# Patient Record
Sex: FEMALE | Race: BLACK OR AFRICAN AMERICAN | NOT HISPANIC OR LATINO | Employment: UNEMPLOYED | URBAN - METROPOLITAN AREA
[De-identification: names, ages, dates, MRNs, and addresses within clinical notes are randomized per-mention and may not be internally consistent; named-entity substitution may affect disease eponyms.]

---

## 2018-09-07 ENCOUNTER — HOSPITAL ENCOUNTER (OUTPATIENT)
Dept: RADIOLOGY | Facility: HOSPITAL | Age: 68
Discharge: HOME | End: 2018-09-07
Attending: INTERNAL MEDICINE
Payer: MEDICARE

## 2018-09-07 DIAGNOSIS — M15.0 PRIMARY GENERALIZED (OSTEO)ARTHRITIS: Primary | ICD-10-CM

## 2018-09-07 DIAGNOSIS — M15.0 PRIMARY GENERALIZED (OSTEO)ARTHRITIS: ICD-10-CM

## 2018-09-07 PROCEDURE — 73560 X-RAY EXAM OF KNEE 1 OR 2: CPT | Mod: TC,FY,PO,RT

## 2018-10-29 DIAGNOSIS — Z12.39 ENCOUNTER FOR SPECIAL SCREENING EXAMINATION FOR NEOPLASM OF BREAST: Primary | ICD-10-CM

## 2018-10-30 ENCOUNTER — HOSPITAL ENCOUNTER (OUTPATIENT)
Dept: RADIOLOGY | Facility: HOSPITAL | Age: 68
Discharge: HOME | End: 2018-10-30
Attending: INTERNAL MEDICINE
Payer: MEDICAID

## 2018-10-30 DIAGNOSIS — Z12.39 ENCOUNTER FOR SPECIAL SCREENING EXAMINATION FOR NEOPLASM OF BREAST: ICD-10-CM

## 2018-10-30 PROCEDURE — 77063 BREAST TOMOSYNTHESIS BI: CPT | Mod: TC,PO

## 2018-12-18 ENCOUNTER — HOSPITAL ENCOUNTER (OUTPATIENT)
Dept: RADIOLOGY | Facility: HOSPITAL | Age: 68
Discharge: HOME | End: 2018-12-18
Attending: INTERNAL MEDICINE
Payer: MEDICAID

## 2018-12-18 DIAGNOSIS — M54.16 RADICULOPATHY, LUMBAR REGION: Primary | ICD-10-CM

## 2018-12-18 DIAGNOSIS — M54.16 RADICULOPATHY, LUMBAR REGION: ICD-10-CM

## 2018-12-18 PROCEDURE — 72100 X-RAY EXAM L-S SPINE 2/3 VWS: CPT | Mod: TC,FY,PO

## 2019-04-17 ENCOUNTER — HOSPITAL ENCOUNTER (EMERGENCY)
Facility: HOSPITAL | Age: 69
Discharge: HOME | End: 2019-04-17
Attending: SURGERY
Payer: MEDICAID

## 2019-04-17 VITALS
OXYGEN SATURATION: 99 % | RESPIRATION RATE: 21 BRPM | WEIGHT: 150 LBS | SYSTOLIC BLOOD PRESSURE: 153 MMHG | TEMPERATURE: 98 F | DIASTOLIC BLOOD PRESSURE: 72 MMHG | HEART RATE: 46 BPM | BODY MASS INDEX: 27.6 KG/M2 | HEIGHT: 62 IN

## 2019-04-17 DIAGNOSIS — R07.9 CHEST PAIN: ICD-10-CM

## 2019-04-17 DIAGNOSIS — H54.7 AMAUROSIS: ICD-10-CM

## 2019-04-17 DIAGNOSIS — I20.89 STABLE ANGINA: Primary | ICD-10-CM

## 2019-04-17 LAB
ALBUMIN SERPL BCP-MCNC: 3.9 G/DL (ref 3.5–5.2)
ALP SERPL-CCNC: 74 U/L (ref 38–126)
ALT SERPL W/O P-5'-P-CCNC: 27 U/L (ref 10–44)
ANION GAP SERPL CALC-SCNC: 4 MMOL/L (ref 8–16)
ANISOCYTOSIS BLD QL SMEAR: SLIGHT
AST SERPL-CCNC: 23 U/L (ref 15–46)
BASOPHILS # BLD AUTO: 0.02 K/UL (ref 0–0.2)
BASOPHILS NFR BLD: 0.4 % (ref 0–1.9)
BILIRUB SERPL-MCNC: 0.9 MG/DL (ref 0.1–1)
BILIRUB UR QL STRIP: NEGATIVE
BUN SERPL-MCNC: 19 MG/DL (ref 7–17)
CALCIUM SERPL-MCNC: 9 MG/DL (ref 8.7–10.5)
CHLORIDE SERPL-SCNC: 113 MMOL/L (ref 95–110)
CLARITY UR REFRACT.AUTO: CLEAR
CO2 SERPL-SCNC: 25 MMOL/L (ref 23–29)
COLOR UR AUTO: YELLOW
CREAT SERPL-MCNC: 0.93 MG/DL (ref 0.5–1.4)
DIFFERENTIAL METHOD: ABNORMAL
EOSINOPHIL # BLD AUTO: 0.1 K/UL (ref 0–0.5)
EOSINOPHIL NFR BLD: 2.6 % (ref 0–8)
ERYTHROCYTE [DISTWIDTH] IN BLOOD BY AUTOMATED COUNT: 16.3 % (ref 11.5–14.5)
EST. GFR  (AFRICAN AMERICAN): >60 ML/MIN/1.73 M^2
EST. GFR  (NON AFRICAN AMERICAN): >60 ML/MIN/1.73 M^2
GLUCOSE SERPL-MCNC: 91 MG/DL (ref 70–110)
GLUCOSE UR QL STRIP: NEGATIVE
HCT VFR BLD AUTO: 28.4 % (ref 37–48.5)
HGB BLD-MCNC: 8.5 G/DL (ref 12–16)
HGB UR QL STRIP: NEGATIVE
HYPOCHROMIA BLD QL SMEAR: ABNORMAL
INR PPP: 1.2 (ref 0.8–1.2)
KETONES UR QL STRIP: NEGATIVE
LEUKOCYTE ESTERASE UR QL STRIP: ABNORMAL
LYMPHOCYTES # BLD AUTO: 1.5 K/UL (ref 1–4.8)
LYMPHOCYTES NFR BLD: 30.6 % (ref 18–48)
MCH RBC QN AUTO: 19 PG (ref 27–31)
MCHC RBC AUTO-ENTMCNC: 29.9 G/DL (ref 32–36)
MCV RBC AUTO: 64 FL (ref 82–98)
MICROSCOPIC COMMENT: NORMAL
MONOCYTES # BLD AUTO: 0.5 K/UL (ref 0.3–1)
MONOCYTES NFR BLD: 9.5 % (ref 4–15)
NEUTROPHILS # BLD AUTO: 2.8 K/UL (ref 1.8–7.7)
NEUTROPHILS NFR BLD: 56.9 % (ref 38–73)
NITRITE UR QL STRIP: NEGATIVE
NT-PROBNP: 758 PG/ML (ref 5–900)
PH UR STRIP: 5 [PH] (ref 5–8)
PLATELET # BLD AUTO: 206 K/UL (ref 150–350)
PMV BLD AUTO: 10.5 FL (ref 9.2–12.9)
POTASSIUM SERPL-SCNC: 4.1 MMOL/L (ref 3.5–5.1)
PROT SERPL-MCNC: 6.9 G/DL (ref 6–8.4)
PROT UR QL STRIP: NEGATIVE
PROTHROMBIN TIME: 12.8 SEC (ref 9–12.5)
RBC # BLD AUTO: 4.47 M/UL (ref 4–5.4)
SODIUM SERPL-SCNC: 142 MMOL/L (ref 136–145)
SP GR UR STRIP: 1.02 (ref 1–1.03)
TROPONIN I SERPL DL<=0.01 NG/ML-MCNC: <0.012 NG/ML (ref 0.01–0.03)
URN SPEC COLLECT METH UR: ABNORMAL
UROBILINOGEN UR STRIP-ACNC: NEGATIVE EU/DL
WBC # BLD AUTO: 4.97 K/UL (ref 3.9–12.7)
WBC #/AREA URNS AUTO: 4 /HPF (ref 0–5)

## 2019-04-17 PROCEDURE — 25000003 PHARM REV CODE 250: Mod: ER | Performed by: SURGERY

## 2019-04-17 PROCEDURE — 93010 ELECTROCARDIOGRAM REPORT: CPT | Mod: ,,, | Performed by: INTERNAL MEDICINE

## 2019-04-17 PROCEDURE — 85610 PROTHROMBIN TIME: CPT | Mod: ER

## 2019-04-17 PROCEDURE — 83880 ASSAY OF NATRIURETIC PEPTIDE: CPT | Mod: ER

## 2019-04-17 PROCEDURE — 84484 ASSAY OF TROPONIN QUANT: CPT | Mod: ER

## 2019-04-17 PROCEDURE — 81000 URINALYSIS NONAUTO W/SCOPE: CPT | Mod: ER

## 2019-04-17 PROCEDURE — 99285 EMERGENCY DEPT VISIT HI MDM: CPT | Mod: ER

## 2019-04-17 PROCEDURE — 93005 ELECTROCARDIOGRAM TRACING: CPT | Mod: ER

## 2019-04-17 PROCEDURE — 80053 COMPREHEN METABOLIC PANEL: CPT | Mod: ER

## 2019-04-17 PROCEDURE — 93010 EKG 12-LEAD: ICD-10-PCS | Mod: ,,, | Performed by: INTERNAL MEDICINE

## 2019-04-17 PROCEDURE — 85025 COMPLETE CBC W/AUTO DIFF WBC: CPT | Mod: ER

## 2019-04-17 RX ORDER — FOLIC ACID 1 MG/1
1 TABLET ORAL DAILY
COMMUNITY

## 2019-04-17 RX ORDER — TAMSULOSIN HYDROCHLORIDE 0.4 MG/1
0.4 CAPSULE ORAL DAILY
COMMUNITY

## 2019-04-17 RX ORDER — HYDROCODONE BITARTRATE AND ACETAMINOPHEN 5; 325 MG/1; MG/1
1 TABLET ORAL EVERY 12 HOURS PRN
Qty: 10 TABLET | Refills: 0 | Status: SHIPPED | OUTPATIENT
Start: 2019-04-17

## 2019-04-17 RX ORDER — FOLIC ACID 0.8 MG
800 TABLET ORAL DAILY
COMMUNITY

## 2019-04-17 RX ORDER — LEVOTHYROXINE SODIUM 100 UG/1
100 TABLET ORAL DAILY
COMMUNITY

## 2019-04-17 RX ORDER — NITROGLYCERIN 0.4 MG/1
0.4 TABLET SUBLINGUAL EVERY 5 MIN PRN
Qty: 100 TABLET | Refills: 0 | Status: SHIPPED | OUTPATIENT
Start: 2019-04-17 | End: 2020-04-16

## 2019-04-17 RX ORDER — NAPROXEN SODIUM 220 MG/1
81 TABLET, FILM COATED ORAL DAILY
COMMUNITY

## 2019-04-17 RX ORDER — HYDROCODONE BITARTRATE AND ACETAMINOPHEN 5; 325 MG/1; MG/1
1 TABLET ORAL
Status: COMPLETED | OUTPATIENT
Start: 2019-04-17 | End: 2019-04-17

## 2019-04-17 RX ORDER — ATORVASTATIN CALCIUM 20 MG/1
20 TABLET, FILM COATED ORAL DAILY
COMMUNITY

## 2019-04-17 RX ORDER — LISINOPRIL 10 MG/1
10 TABLET ORAL DAILY
COMMUNITY

## 2019-04-17 RX ORDER — FERROUS SULFATE 325(65) MG
325 TABLET ORAL
COMMUNITY

## 2019-04-17 RX ORDER — UBIDECARENONE 75 MG
500 CAPSULE ORAL DAILY
COMMUNITY

## 2019-04-17 RX ORDER — MELOXICAM 7.5 MG/1
7.5 TABLET ORAL DAILY
COMMUNITY

## 2019-04-17 RX ORDER — GLIPIZIDE 2.5 MG/1
5 TABLET, EXTENDED RELEASE ORAL
COMMUNITY

## 2019-04-17 RX ADMIN — LIDOCAINE HYDROCHLORIDE: 20 SOLUTION ORAL; TOPICAL at 01:04

## 2019-04-17 RX ADMIN — HYDROCODONE BITARTRATE AND ACETAMINOPHEN 1 TABLET: 5; 325 TABLET ORAL at 01:04

## 2019-04-17 NOTE — ED PROVIDER NOTES
"Encounter Date: 4/17/2019       History     Chief Complaint   Patient presents with    Chest Pain     Pt states has had chest pain off and on since last pm, pt went to PCP this am and was sent to ED for evaluation.  Pt denies SOB.  Pt states has "sharp" pain to midsternal area.       Patient seen with complaint of with intermittent left-sided chest pain with left hand tingling since yesterday.  The pain is well localized to chest wall above her breast and is reproducible She has had a history of a cardiac stent placed in [place] 7 years ago for a failed stress test.  She has not seen any cardiologist here as she had moved into the area recently.  She has a history of thalassemia.  She has a history of transient monoocular vision loss 2 weeks ago seen by ophthalmologist.  She has no history of carotid vascular disease  She has a history of hypertension and diabetes.  She is on multiple medications including aspirin, statins, iron supplements thyroxine lisinopril Januvia and Flomax    The history is provided by the patient.   Chest Pain   The current episode started yesterday. Duration of episode(s) is 1 day. Chest pain occurs intermittently. The chest pain is improving. The pain is associated with exertion. At its most intense, the chest pain is at 5/10. The chest pain is currently at 4/10. The quality of the pain is described as aching. The pain does not radiate. Chest pain is worsened by certain positions and exertion. Pertinent negatives for primary symptoms include no syncope, no shortness of breath, no cough, no wheezing, no nausea, no vomiting, no dizziness and no altered mental status. She tried nothing for the symptoms.     Review of patient's allergies indicates:  No Known Allergies  Past Medical History:   Diagnosis Date    Asthma     Diabetes mellitus     Heart attack     per pt    Hypertension     Thalassemia     Thyroid disease      Past Surgical History:   Procedure Laterality Date    " HYSTERECTOMY      OOPHORECTOMY      THYROID SURGERY       History reviewed. No pertinent family history.  Social History     Tobacco Use    Smoking status: Never Smoker    Smokeless tobacco: Never Used   Substance Use Topics    Alcohol use: Not Currently    Drug use: Never     Review of Systems   Constitutional: Negative.    HENT: Negative.    Eyes: Negative.    Respiratory: Negative.  Negative for cough, shortness of breath and wheezing.    Cardiovascular: Positive for chest pain. Negative for syncope.   Gastrointestinal: Negative for nausea and vomiting.   Endocrine: Negative.    Genitourinary: Negative.    Musculoskeletal: Negative.    Skin: Negative.    Allergic/Immunologic: Negative.    Neurological: Negative.  Negative for dizziness.   Hematological: Negative.    Psychiatric/Behavioral: Negative.        Physical Exam     Initial Vitals [04/17/19 1205]   BP Pulse Resp Temp SpO2   127/61 (!) 52 20 98.4 °F (36.9 °C) 98 %      MAP       --         Physical Exam    Nursing note and vitals reviewed.  Constitutional: She appears well-developed and well-nourished.   HENT:   Head: Normocephalic.   Eyes: Conjunctivae are normal.   Neck: Normal range of motion.   Cardiovascular: Normal rate.   Pulmonary/Chest: Breath sounds normal. She exhibits tenderness.   Abdominal: Soft.   Musculoskeletal: She exhibits tenderness.        Arms:  Neurological: She is alert and oriented to person, place, and time. She has normal strength. GCS score is 15. GCS eye subscore is 4. GCS verbal subscore is 5. GCS motor subscore is 6.   Skin: Skin is warm and dry.   Psychiatric: She has a normal mood and affect.         ED Course   Procedures  Labs Reviewed   COMPREHENSIVE METABOLIC PANEL - Abnormal; Notable for the following components:       Result Value    Chloride 113 (*)     BUN, Bld 19 (*)     Anion Gap 4 (*)     All other components within normal limits   CBC W/ AUTO DIFFERENTIAL - Abnormal; Notable for the following components:     Hemoglobin 8.5 (*)     Hematocrit 28.4 (*)     MCV 64 (*)     MCH 19.0 (*)     MCHC 29.9 (*)     RDW 16.3 (*)     All other components within normal limits   URINALYSIS, REFLEX TO URINE CULTURE - Abnormal; Notable for the following components:    Leukocytes, UA 1+ (*)     All other components within normal limits    Narrative:     Preferred Collection Type->Urine, Clean Catch   PROTIME-INR - Abnormal; Notable for the following components:    Prothrombin Time 12.8 (*)     All other components within normal limits   TROPONIN I   NT-PRO NATRIURETIC PEPTIDE   URINALYSIS MICROSCOPIC    Narrative:     Preferred Collection Type->Urine, Clean Catch     EKG Readings: (Independently Interpreted)   Rhythm: Sinus Bradycardia. Heart Rate: 46.     ECG Results          EKG 12-lead (In process)  Result time 04/17/19 12:25:29    In process by Interface, Lab In [facility] (04/17/19 12:25:29)                 Narrative:    Test Reason : R07.9,    Vent. Rate : 046 BPM     Atrial Rate : 046 BPM     P-R Int : 156 ms          QRS Dur : 066 ms      QT Int : 462 ms       P-R-T Axes : 065 034 049 degrees     QTc Int : 404 ms    Sinus bradycardia  Cannot rule out Anterior infarct ,age undetermined  Abnormal ECG  No previous ECGs available    Referred By: [email or web address]   SELF           Confirmed By:                   In process by Interface, Lab In [facility] (04/17/19 12:24:58)                 Narrative:    Test Reason : R07.9,    Vent. Rate : 046 BPM     Atrial Rate : 046 BPM     P-R Int : 156 ms          QRS Dur : 066 ms      QT Int : 462 ms       P-R-T Axes : 065 034 049 degrees     QTc Int : 404 ms    Sinus bradycardia  Cannot rule out Anterior infarct ,age undetermined  Abnormal ECG  No previous ECGs available    Referred By: [email or web address]   SELF           Confirmed By:                             Imaging Results          US Carotid Bilateral (Final result)  Result time 04/17/19 13:05:47    Final result by Salomón Maher MD (Timothy) (04/17/19  13:05:47)                 Impression:      1. Normal velocities and ratios in the extracranial right carotid system. Focal calcified plaque at the bulb.  Peak systolic velocity in the right ICA is 65 cm/sec. No significant stenosis.    2. Normal velocities and ratios in the extracranial left carotid system. Mild plaque is present. Peak systolic velocity in the left ICA is 59 cm/sec. No significant stenosis.    3. Normal antegrade flow in vertebral arteries bilaterally.  High resistive flow in the left vertebral artery suggests the possibility of distal stenosis.    Measurements are based on NASCET criteria.    Stenosis of less than 30%%-validated velocity measurements with angiographic measurements, velocity criteria are extrapolated from diameter data as defined by the Society of Radiologists in Ultrasound Consensus Conference Radiology 2003; 229;340-346.      Electronically signed by: Salomón Maher MD  Date:    04/17/2019  Time:    13:05             Narrative:    EXAMINATION:  US CAROTID BILATERAL    CLINICAL HISTORY:  Unspecified visual loss    FINDINGS:  Color flow and Spectral Doppler Vascular ultrasound was performed of bilateral carotid and vertebral arteries.                               X-Ray Chest AP Portable (Final result)  Result time 04/17/19 12:37:39    Final result by Salomón Maher MD (Timothy) (04/17/19 12:37:39)                 Impression:      Mild cardiomegaly.  Clear lungs.      Electronically signed by: Salomón Maher MD  Date:    04/17/2019  Time:    12:37             Narrative:    EXAMINATION:  XR CHEST AP PORTABLE    CLINICAL HISTORY:  , Chest pain;    COMPARISON:  No prior exams.    FINDINGS:  Atherosclerosis of thoracic aorta.  Mild cardiomegaly.  Clear lungs.                                 Medical Decision Making:   Initial Assessment:   Chest pain of uncertain etiology  ED Management:  Patient had a 24 hr history of chest pain intermittent which is reproducible on physical exam but  she has a history of a cardiac stent.  EKG shows bradycardia but no acute ischemic changes troponin is negative. Patient is chest pain-free but she has a high risk for angina and recommend cardiology evaluation ASAP.  She will make an appointment with Dr. Wilson to see him this week and to return to the ED for any chest pain.  She had a history of loss of vision in 1 eye and the ophthalmologist recommended a carotid ultrasound.  Carotid ultrasound is negative                      Clinical Impression:       ICD-10-CM ICD-9-CM   1. Stable angina I20.8 413.9   2. Chest pain R07.9 786.50   3. Amaurosis H54.7 369.00         Disposition:   Disposition: Discharged  Condition: Stable                        RICKEY Hanks III, MD  04/17/19 3575

## 2019-09-04 DIAGNOSIS — M54.9 BACK PAIN: Primary | ICD-10-CM

## 2019-09-13 ENCOUNTER — CLINICAL SUPPORT (OUTPATIENT)
Dept: REHABILITATION | Facility: HOSPITAL | Age: 69
End: 2019-09-13
Payer: MEDICAID

## 2019-09-13 DIAGNOSIS — G89.29 CHRONIC BILATERAL LOW BACK PAIN WITH RIGHT-SIDED SCIATICA: ICD-10-CM

## 2019-09-13 DIAGNOSIS — M54.41 CHRONIC BILATERAL LOW BACK PAIN WITH RIGHT-SIDED SCIATICA: ICD-10-CM

## 2019-09-13 PROCEDURE — 97110 THERAPEUTIC EXERCISES: CPT | Mod: PO

## 2019-09-13 PROCEDURE — 97162 PT EVAL MOD COMPLEX 30 MIN: CPT | Mod: PO

## 2019-09-13 NOTE — PLAN OF CARE
[facility]  Physical Therapy Initial Evaluation    Name: Marta Key  Clinic Number: [id number]    Therapy Diagnosis:   Encounter Diagnosis   Name Primary?    Chronic bilateral low back pain with right-sided sciatica      Physician: Ericka Montaño MD    Physician Orders: PT Eval and Treat   Medical Diagnosis from Referral: M54.9 (ICD-10-CM) - Back pain   Evaluation Date: 9/13/2019  Authorization Period Expiration: 12/31/2019   Plan of Care Expiration: 11/13/19  Visit # / Visits authorized: 1/ 20    Time In: 9:00am  Time Out: 10:00am  Total Billable Time: 60 minutes    Precautions: Standard    Subjective   Date of onset: 9/4/19  History of current condition - Marta reports: history of low back pain and reports of surgery two years ago. She reports no relief with surgery and having increased pain recently.  She has constant low back pain across low back with pain worse on right side and beginnign to have symptoms into right LE.  Patient ambulates with standard cane due to back pain, decreased tolerance to standing, unable to perform house work, or cooking.      Medical History:   Past Medical History:   Diagnosis Date    Asthma     Diabetes mellitus     Heart attack     per pt    Hypertension     Thalassemia     Thyroid disease        Surgical History:   Marta Key  has a past surgical history that includes Hysterectomy; Oophorectomy; and Thyroid surgery.    Medications:   Marta has a current medication list which includes the following prescription(s): aspirin, atorvastatin, cyanocobalamin, ferrous sulfate, folic acid, folic acid, glipizide, hydrocodone-acetaminophen, levothyroxine, lisinopril, meloxicam, nitroglycerin, sitagliptin, and tamsulosin.    Allergies:   Review of patient's allergies indicates:  No Known Allergies     Imaging, X-ray  FINDINGS:  Alignment: Mild grade 1 anterolisthesis of L3 on L4.  Mild calcification of the posterior disc annulus at L2-3 and L3-4 and  L4-5 and L5-S1.    Vertebrae: Vertebral body heights are maintained.  No suspicious appearing lytic or blastic lesions.    Discs and facets: Disc heights are maintained. Facet joints are unremarkable.    Miscellaneous: Sacroiliac joints unremarkable.       Prior Therapy: no  Social History:  lives with their family  Occupation: not working  Prior Level of Function: independent  Current Level of Function: limited tolerance to house work, cooking, and walking    Pain:  Current 9/10, worst 9/10, best 9/10   Location: bilateral back  and buttocks   Description: Aching, Throbbing, Tight and Deep  Aggravating Factors: Standing, Bending, Walking, Lifting and Getting out of bed/chair  Easing Factors: lying down and rest    Pts goals: decrease pain    Objective     Observation: slow and painful sit to stand transfer    Posture:  Stands with forward flexed posture, side bend to right, trunk rotated to left    Lumbar Range of Motion:    Degrees Pain   Flexion Reach knee   Low back        Extension 0 deg   Low back        Left Side Bending Reach to mid thigh Low back        Right Side Bending Reach to mid thigh Low back        Left rotation   30 deg Low back        Right Rotation   30 deg Low back             Lower Extremity Strength  Right LE  Left LE    Knee extension: 4/5 Knee extension: 4/5   Knee flexion: 4/5 Knee flexion: 4/5   Hip flexion: 4-/5 Hip flexion: 4-/5   Hip extension:  4-/5 Hip extension: 4-/5   Hip abduction: 4-/5 Hip abduction: 4-/5   Hip adduction: 4-/5 Hip adduction 4-/5   Ankle dorsiflexion: 5/5 Ankle dorsiflexion: 5/5   Ankle plantarflexion: 5/5 Ankle plantarflexion: 5/5         Special Tests:  -Repeated Flexion: positive  -Repeated Ext: positive  -Piriformis Test: positive on right    DTR:   Right Left Comment   Patellar (L3-4) 2+ 2+    Achilles (S1) 2+ 2+        Neuro Dynamic Testing:    Sciatic nerve:      SLR: R = positive     L = negative       Femoral Nerve:    Femoral nerve test:  "negative      Joint Mobility: hypomobile spring testing    Palpation: tenderness to palpation of lumbar erector spinae, quadratus lumborum, gluteus medius, rohit, and piriformis    Sensation: intact to light touch    Flexibility:   Popliteal Angle: R = 35 degrees ; L = 30 degrees   Wilman's test: R = positive ; L = positive   Marek test: R = positive for psoas and quad tightness ; L = positive for psoas and quad tightness        CMS Impairment/Limitation/Restriction for FOTO Lumbar Survey    Therapist reviewed FOTO scores for Marta Key on 9/13/2019.   FOTO documents entered into [employer] - see Media section.    Limitation Score: 63%  Category: Other    Current : CL = least 60% but < 80% impaired, limited or restricted  Goal: CK = at least 40% but < 60% impaired, limited or restricted  Discharge: not at this time         TREATMENT   Treatment Time In: 9:45am  Treatment Time Out: 10:00am  Total Treatment time separate from Evaluation: 15 minutes    Marta received therapeutic exercises to develop strength, endurance, ROM, flexibility, posture and core stabilization for 15 minutes including:  - single knee to chest 5 x 10"  - supine active hamstring stretch 5 x 10"  -supine lower trunk rotations 2 x 10  - piriformis stretch 5 x 10"  - pelvic tilts 1 x 10 x 3"    Home Exercises and Patient Education Provided    Education provided:   - HEP    Written Home Exercises Provided: yes.  Exercises were reviewed and Marta was able to demonstrate them prior to the end of the session.  Marta demonstrated good  understanding of the education provided.     See EMR under Patient Instructions for exercises provided 9/13/2019.    Assessment   Marta is a 69 y.o. female referred to outpatient Physical Therapy with a medical diagnosis of back pain. Pt presents with signs and symptoms consistent with the diagnosis.  She is noted to have forward flexed posture, ambulates with cane, has limited LROM, decreased LE flexibility, decreased " strength, and back pain limiting tolerance to ADL's.  Patient would benefit from manual therapy, LE stretching and strengthening, core stabilization exercises, and modalities to decrease pain, improve mobility, and return to prior level of function.    Pt prognosis is Good.   Pt will benefit from skilled outpatient Physical Therapy to address the deficits stated above and in the chart below, provide pt/family education, and to maximize pt's level of independence.     Plan of care discussed with patient: Yes  Pt's spiritual, cultural and educational needs considered and patient is agreeable to the plan of care and goals as stated below:     Anticipated Barriers for therapy: none    Medical Necessity is demonstrated by the following  History  Co-morbidities and personal factors that may impact the plan of care Co-morbidities:   advanced age, diabetes and prior lumbar surgery    Personal Factors:   age  character  attitudes     high   Examination  Body Structures and Functions, activity limitations and participation restrictions that may impact the plan of care Body Regions:   back    Body Systems:    ROM  strength  balance  gait  transfers    Participation Restrictions:   none    Activity limitations:   Learning and applying knowledge  no deficits    General Tasks and Commands  no deficits    Communication  no deficits    Mobility  lifting and carrying objects  walking  moving around using equipment (WC)    Self care  no deficits    Domestic Life  shopping  cooking  doing house work (cleaning house, washing dishes, laundry)  assisting others    Interactions/Relationships  no deficits    Life Areas  no deficits    Community and Social Life  no deficits         moderate   Clinical Presentation stable and uncomplicated moderate   Decision Making/ Complexity Score: moderate     Goals:  Short Term Goals: 4 weeks  1. Patient will be compliant with HEP to promote the independent management of current diagnosis.  2. Patient  will increase lumbar forward bending to reach mid shin level for function of dressing.  3. Patient will improve bilateral hamstring flexibility to (25)deg during 90/90 testing.  4. Patient will report a decrease in complaints of low back pain from 9/10 to 6/10 during ADLs.      Long Term Goals:  8 weeks  1. Patient will improve core stabilization strength to Good to improve tolerance to normal house work activities for self care independence.  2. Patient will increase lumbar forward bending to reach ankle level for function of dressing.  3. Patient will report a decrease in complaints of low back pain from 9/10 to 4/10 during ADLs.  4. Patient will improve FOTO limitation status from 63% to 55% placing the patient in the 40-60% impaired, limited, or restricted category indicating increased functional mobility.      Plan   Plan of care Certification: 9/13/2019 to 11/13/19.    Outpatient Physical Therapy 2 times weekly for 8 weeks to include the following interventions: Gait Training, Manual Therapy, Moist Heat/ Ice, Neuromuscular Re-ed, Patient Education, Therapeutic Activites, Therapeutic Exercise, Ultrasound and IASTM, dry needling, kinesiotaping.     Tez Choi, PT

## 2019-09-17 ENCOUNTER — CLINICAL SUPPORT (OUTPATIENT)
Dept: REHABILITATION | Facility: HOSPITAL | Age: 69
End: 2019-09-17
Payer: MEDICAID

## 2019-09-17 DIAGNOSIS — G89.29 CHRONIC BILATERAL LOW BACK PAIN WITH RIGHT-SIDED SCIATICA: ICD-10-CM

## 2019-09-17 DIAGNOSIS — M54.41 CHRONIC BILATERAL LOW BACK PAIN WITH RIGHT-SIDED SCIATICA: ICD-10-CM

## 2019-09-17 PROCEDURE — 97110 THERAPEUTIC EXERCISES: CPT | Mod: PO

## 2019-09-17 NOTE — PROGRESS NOTES
"  Physical Therapy Daily Treatment Note     Name: Marta Key  Clinic Number: [id number]    Therapy Diagnosis:   Encounter Diagnosis   Name Primary?    Chronic bilateral low back pain with right-sided sciatica        Physician: Ericka Montaño MD    Visit Date: 9/17/2019    Physician Orders: PT Eval and Treat   Medical Diagnosis from Referral: M54.9 (ICD-10-CM) - Back pain   Evaluation Date: 9/13/2019  Authorization Period Expiration: 12/31/2019   Plan of Care Expiration: 11/13/19  Visit # / Visits authorized: 2/ 20    Time In: 10:00am  Time Out: 10:55am  Total Billable Time: 55 minutes    Precautions: Standard    Subjective     Pt reports: having some soreness following evaluation.  She continues to report constant low back pain that limits tolerance to ADL's.  She was compliant with home exercise program.  Response to previous treatment: muscle soreness in back  Functional change: none at this time    Pain: 8/10  Location: across low back and down right leg to ankle level    Objective     Marta received therapeutic exercises to develop strength, ROM, flexibility, and core stabilization for 55 minutes including:    Date  9/17/19   VISIT 2/20       POC EXP. DATE 11/13/19   FACE-TO-FACE 2/10  10/13/19   FOTO 2/5       TABLE:    Hamstring stretch 5 x 10"   Piriformis stretch 5 x 10"   Hip flexor stretch 2 x 30"   TA 10 x 5"   bridging 1 x 10   SLR 1 x 10   Hip abduction 2 x 10 RTB   Hip adduction 20 x 3" w/ball           SEATED:    LAQ 2 x 10 x 1#   Hip flexion with TA 2 x 10 x 1#   Hamstring curl 2 x 10 RTB       STANDING:    Mini squat 1 x 10   Hip abduction 1 x 10   Hip extension 1 x 10       Initials MA         Home Exercises Provided and Patient Education Provided     Education provided:   - HEP    Written Home Exercises Provided: Patient instructed to cont prior HEP.  Exercises were reviewed and Marta was able to demonstrate them prior to the end of the session.  Marta demonstrated good  understanding of the " education provided.     See EMR under Patient Instructions for exercises provided prior visit.    Assessment     Marta is able to progress towards goals with addition of strengthening and core stabilization exercises.  She requires verbal and tactile cues for abdominal bracing during bridging and seated hip flexion exercises.  Patient with continue with current plan of care with progression as tolerated to improve tolerance to ADL's.  Marta is progressing well towards her goals.   Pt prognosis is Good.     Pt will continue to benefit from skilled outpatient physical therapy to address the deficits listed in the problem list box on initial evaluation, provide pt/family education and to maximize pt's level of independence in the home and community environment.     Pt's spiritual, cultural and educational needs considered and pt agreeable to plan of care and goals.     Anticipated barriers to physical therapy: none    Goals:   Short Term Goals: 4 weeks  1. Patient will be compliant with HEP to promote the independent management of current diagnosis.  2. Patient will increase lumbar forward bending to reach mid shin level for function of dressing.  3. Patient will improve bilateral hamstring flexibility to (25)deg during 90/90 testing.  4. Patient will report a decrease in complaints of low back pain from 9/10 to 6/10 during ADLs.        Long Term Goals:  8 weeks  1. Patient will improve core stabilization strength to Good to improve tolerance to normal house work activities for self care independence.  2. Patient will increase lumbar forward bending to reach ankle level for function of dressing.  3. Patient will report a decrease in complaints of low back pain from 9/10 to 4/10 during ADLs.  4. Patient will improve FOTO limitation status from 63% to 55% placing the patient in the 40-60% impaired, limited, or restricted category indicating increased functional mobility.    Plan     Progress reps on supine exercises next  visit.     Tez hCoi, PT

## 2019-09-20 ENCOUNTER — CLINICAL SUPPORT (OUTPATIENT)
Dept: REHABILITATION | Facility: HOSPITAL | Age: 69
End: 2019-09-20
Payer: MEDICAID

## 2019-09-20 DIAGNOSIS — G89.29 CHRONIC BILATERAL LOW BACK PAIN WITH RIGHT-SIDED SCIATICA: ICD-10-CM

## 2019-09-20 DIAGNOSIS — M54.41 CHRONIC BILATERAL LOW BACK PAIN WITH RIGHT-SIDED SCIATICA: ICD-10-CM

## 2019-09-20 PROCEDURE — 97110 THERAPEUTIC EXERCISES: CPT | Mod: PO

## 2019-09-20 NOTE — PROGRESS NOTES
"  Physical Therapy Daily Treatment Note     Name: Marta Key  Clinic Number: [id number]    Therapy Diagnosis:   Encounter Diagnosis   Name Primary?    Chronic bilateral low back pain with right-sided sciatica        Physician: Ericka Montaño MD    Visit Date: 9/20/2019    Physician Orders: PT Eval and Treat   Medical Diagnosis from Referral: M54.9 (ICD-10-CM) - Back pain   Evaluation Date: 9/13/2019  Authorization Period Expiration: 12/31/2019   Plan of Care Expiration: 11/13/19  Visit # / Visits authorized: 3/ 20    Time In: 10:00 am  Time Out: 10:32 am  Total Billable Time: 32 minutes    Precautions: Standard    Subjective     Pt reports: she continues with constant low back pain.  She was compliant with home exercise program.  Response to previous treatment: muscle soreness in back  Functional change: none at this time    Pain: 8/10  Location: across low back and down right leg to ankle level    Objective     Marta received therapeutic exercises to develop strength, ROM, flexibility, and core stabilization for 32 minutes including:    Date  09/20/19 9/17/19   VISIT 3/20 2/20   POC EXP. DATE 11/13/19 11/13/19   FACE-TO-FACE 10/13/19 10/13/19   FOTO 3/5 2/5        TABLE:     Hamstring stretch 5 x 10" 5 x 10"   Piriformis stretch 5 x 10" 5 x 10"   Hip flexor stretch 2 x 30" 2 x 30"   TA 10 x 5" 10 x 5"   bridging 1 x 15 1 x 10   SLR 2 x 10 1 x 10   Hip abduction 2 x 10 RTB 2 x 10 RTB   Hip adduction 20 x 3" w/ ball 20 x 3" w/ball             SEATED:     LAQ 3 x 10 x 1# 2 x 10 x 1#   Hip flexion with TA 3 x 10 x 1# 2 x 10 x 1#   Hamstring curl 3 x 10 RTB 2 x 10 RTB        STANDING:     Mini squat 1 x 15 1 x 10   Hip abduction 2 x 10 1 x 10   Hip extension 2 x 10 1 x 10        Initials GWA 1/6 MA         Home Exercises Provided and Patient Education Provided     Education provided:   - HEP    Written Home Exercises Provided: yes.  Exercises were reviewed and Marta was able to demonstrate them prior to the end of " the session.  Marta demonstrated good  understanding of the education provided.     See EMR under Patient Instructions for exercises provided 09/13/2019.    Assessment     Patient performed above exercise program with verbal cueing for proper technique, cues for abdominal bracing during bridging and seated hip flexion exercises and had no difficulty with today's progressions with no increase in symptoms prior to leaving the clinic. .    Marta is progressing well towards her goals.   Pt prognosis is Good.     Pt will continue to benefit from skilled outpatient physical therapy to address the deficits listed in the problem list box on initial evaluation, provide pt/family education and to maximize pt's level of independence in the home and community environment.     Pt's spiritual, cultural and educational needs considered and pt agreeable to plan of care and goals.     Anticipated barriers to physical therapy: none    Goals:   Short Term Goals: 4 weeks  1. Patient will be compliant with HEP to promote the independent management of current diagnosis.  2. Patient will increase lumbar forward bending to reach mid shin level for function of dressing.  3. Patient will improve bilateral hamstring flexibility to (25)deg during 90/90 testing.  4. Patient will report a decrease in complaints of low back pain from 9/10 to 6/10 during ADLs.        Long Term Goals:  8 weeks  1. Patient will improve core stabilization strength to Good to improve tolerance to normal house work activities for self care independence.  2. Patient will increase lumbar forward bending to reach ankle level for function of dressing.  3. Patient will report a decrease in complaints of low back pain from 9/10 to 4/10 during ADLs.  4. Patient will improve FOTO limitation status from 63% to 55% placing the patient in the 40-60% impaired, limited, or restricted category indicating increased functional mobility.    Plan     Progress reps on standing exercises  next visit.     Gen Alvarez, PTA

## 2019-09-24 ENCOUNTER — CLINICAL SUPPORT (OUTPATIENT)
Dept: REHABILITATION | Facility: HOSPITAL | Age: 69
End: 2019-09-24
Payer: MEDICAID

## 2019-09-24 DIAGNOSIS — G89.29 CHRONIC BILATERAL LOW BACK PAIN WITH RIGHT-SIDED SCIATICA: ICD-10-CM

## 2019-09-24 DIAGNOSIS — M54.41 CHRONIC BILATERAL LOW BACK PAIN WITH RIGHT-SIDED SCIATICA: ICD-10-CM

## 2019-09-24 PROCEDURE — 97110 THERAPEUTIC EXERCISES: CPT | Mod: PO

## 2019-09-24 NOTE — PROGRESS NOTES
"  Physical Therapy Daily Treatment Note     Name: Marta Key  Clinic Number: [id number]    Therapy Diagnosis:   Encounter Diagnosis   Name Primary?    Chronic bilateral low back pain with right-sided sciatica        Physician: Ericka Montaño MD    Visit Date: 9/24/2019    Physician Orders: PT Eval and Treat   Medical Diagnosis from Referral: M54.9 (ICD-10-CM) - Back pain   Evaluation Date: 9/13/2019  Authorization Period Expiration: 12/31/2019   Plan of Care Expiration: 11/13/19  Visit # / Visits authorized: 4/ 20    Time In: 10:15 am  Time Out: 10:55 am  Total Billable Time: 30 minute    Precautions: Standard    Subjective     Pt reports: she continues to have constant pain in right LE but reporting gradual decrease in the intensity of her symptoms with therapy.  She was compliant with home exercise program.  Response to previous treatment: muscle soreness in back  Functional change: none at this time    Pain: 7/10  Location: across low back and down right leg to ankle level    Objective     Marta received therapeutic exercises to develop strength, ROM, flexibility, and core stabilization for 32 minutes including:    Date  9/20/19 09/20/19 9/17/19   VISIT 4/20 3/20 2/20   POC EXP. DATE 11/13/19 11/13/19 11/13/19   FACE-TO-FACE 10/13/19 10/13/19 10/13/19   FOTO 4/5 3/5 2/5         TABLE:      Hamstring stretch 10 x 10" 5 x 10" 5 x 10"   Piriformis stretch 10 x 10" 5 x 10" 5 x 10"   Hip flexor stretch 2 x 30" 2 x 30" 2 x 30"   TA 20 x 5" 10 x 5" 10 x 5"   bridging 2 x 10 1 x 15 1 x 10   SLR 2 x 10 2 x 10 1 x 10   Hip abduction 3 x 10 GTB 2 x 10 RTB 2 x 10 RTB   Hip adduction 30 x 3" w/ball 20 x 3" w/ ball 20 x 3" w/ball               SEATED:      LAQ 3 x 10 x 1.5# 3 x 10 x 1# 2 x 10 x 1#   Hip flexion with TA 3 x 10 x 1.5# 3 x 10 x 1# 2 x 10 x 1#   Hamstring curl 3 x 10 GTB 3 x 10 RTB 2 x 10 RTB         STANDING:      Mini squat 2 x 10 1 x 15 1 x 10   Hip abduction 2 x 10 2 x 10 1 x 10   Hip extension 2 x 10 2 x " 10 1 x 10         Initials MA GWA 1/6 MA         Home Exercises Provided and Patient Education Provided     Education provided:   - HEP    Written Home Exercises Provided: yes.  Exercises were reviewed and Marta was able to demonstrate them prior to the end of the session.  Marta demonstrated good  understanding of the education provided.     See EMR under Patient Instructions for exercises provided 09/13/2019.    Assessment     Marta is able to progress strengthening exercises on this date and requires fewer verbal cues for proper form and technique during bridging and pelvic tilts.  She will continue with progression of standing exercises to improve functional strength and tolerance to ADL's.    Marta is progressing well towards her goals.   Pt prognosis is Good.     Pt will continue to benefit from skilled outpatient physical therapy to address the deficits listed in the problem list box on initial evaluation, provide pt/family education and to maximize pt's level of independence in the home and community environment.     Pt's spiritual, cultural and educational needs considered and pt agreeable to plan of care and goals.     Anticipated barriers to physical therapy: none    Goals:   Short Term Goals: 4 weeks  1. Patient will be compliant with HEP to promote the independent management of current diagnosis. In Progress  2. Patient will increase lumbar forward bending to reach mid shin level for function of dressing. In Progress  3. Patient will improve bilateral hamstring flexibility to (25)deg during 90/90 testing. In Progress  4. Patient will report a decrease in complaints of low back pain from 9/10 to 6/10 during ADLs. In Progress        Long Term Goals:  8 weeks  1. Patient will improve core stabilization strength to Good to improve tolerance to normal house work activities for self care independence. In Progress  2. Patient will increase lumbar forward bending to reach ankle level for function of dressing.  In Progress  3. Patient will report a decrease in complaints of low back pain from 9/10 to 4/10 during ADLs. In Progress  4. Patient will improve FOTO limitation status from 63% to 55% placing the patient in the 40-60% impaired, limited, or restricted category indicating increased functional mobility. In Progress    Plan     Progress reps on standing exercises next visit.     Tez Choi, PT

## 2019-10-01 ENCOUNTER — CLINICAL SUPPORT (OUTPATIENT)
Dept: REHABILITATION | Facility: HOSPITAL | Age: 69
End: 2019-10-01
Payer: MEDICARE

## 2019-10-01 DIAGNOSIS — M54.41 CHRONIC BILATERAL LOW BACK PAIN WITH RIGHT-SIDED SCIATICA: ICD-10-CM

## 2019-10-01 DIAGNOSIS — G89.29 CHRONIC BILATERAL LOW BACK PAIN WITH RIGHT-SIDED SCIATICA: ICD-10-CM

## 2019-10-01 PROCEDURE — 97110 THERAPEUTIC EXERCISES: CPT | Mod: PO

## 2019-10-01 NOTE — PROGRESS NOTES
"  Physical Therapy Daily Treatment Note     Name: Marta Key  Clinic Number: [id number]    Therapy Diagnosis:   Encounter Diagnosis   Name Primary?    Chronic bilateral low back pain with right-sided sciatica      Physician: Ericka Montaño MD    Visit Date: 10/1/2019    Physician Orders: PT Eval and Treat   Medical Diagnosis from Referral: M54.9 (ICD-10-CM) - Back pain   Evaluation Date: 9/13/2019  Authorization Period Expiration: 12/31/2019   Plan of Care Expiration: 11/13/19  Visit # / Visits authorized: 5/ 20    Time In: 10:00 am  Time Out: 10:58 am  Total Billable Time: 38 minute    Precautions: Standard    Subjective     Pt reports: she continues to have constant pain in right LE, always the same.  She was compliant with home exercise program.  Response to previous treatment: muscle soreness in back  Functional change: none at this time    Pain: 7/10  Location: across low back and down right leg to ankle level    Objective     Marta received therapeutic exercises to develop strength, ROM, flexibility, and core stabilization for 38 minutes including:    Date  10/01/19 9/20/19 09/20/19 9/17/19   VISIT 5/20 4/20 3/20 2/20   POC EXP. DATE 11/13/19 11/13/19 11/13/19 11/13/19   FACE-TO-FACE 10/13/19 10/13/19 10/13/19 10/13/19   FOTO 5/5 4/5 3/5 2/5          TABLE:       Hamstring stretch 10 x 10" 10 x 10" 5 x 10" 5 x 10"   Piriformis stretch 10 x 10" 10 x 10" 5 x 10" 5 x 10"   Hip flexor stretch 2 x 30" 2 x 30" 2 x 30" 2 x 30"   TA 20 x 5" 20 x 5" 10 x 5" 10 x 5"   bridging 2 x 10 2 x 10 1 x 15 1 x 10   SLR 2 x 10 2 x 10 2 x 10 1 x 10   Hip abduction 3 x 10 GTB 3 x 10 GTB 2 x 10 RTB 2 x 10 RTB   Hip adduction 30 x 3" w/ ball 30 x 3" w/ball 20 x 3" w/ ball 20 x 3" w/ball                 SEATED:       LAQ 3 x 10 x 1.5# 3 x 10 x 1.5# 3 x 10 x 1# 2 x 10 x 1#   Hip flexion with TA 3 x 10 x 1.5# 3 x 10 x 1.5# 3 x 10 x 1# 2 x 10 x 1#   Hamstring curl 3 x 10 GTB 3 x 10 GTB 3 x 10 RTB 2 x 10 RTB          STANDING:     "   Mini squat 2 x 10 2 x 10 1 x 15 1 x 10   Hip abduction 3 x 10 2 x 10 2 x 10 1 x 10   Hip extension 3 x 10 2 x 10 2 x 10 1 x 10          Initials GWA 1/6 MA GWA 1/6 MA       Functional limitation reporting disability percentage was obtained through use of FOTO lumbar spine Survey indicating 66% disability     Home Exercises Provided and Patient Education Provided     Education provided:   - HEP    Written Home Exercises Provided: yes.  Exercises were reviewed and Marta was able to demonstrate them prior to the end of the session.  Marta demonstrated good  understanding of the education provided.     See EMR under Patient Instructions for exercises provided 09/13/2019.    Assessment     Marta performed above exercise program with verbal cueing avoid using momentum and had no difficulty with today's progressions with no increase in symptoms prior to leaving the clinic.     Marta is progressing well towards her goals.   Pt prognosis is Good.     Pt will continue to benefit from skilled outpatient physical therapy to address the deficits listed in the problem list box on initial evaluation, provide pt/family education and to maximize pt's level of independence in the home and community environment.     Pt's spiritual, cultural and educational needs considered and pt agreeable to plan of care and goals.     Anticipated barriers to physical therapy: none    Goals:   Short Term Goals: 4 weeks  1. Patient will be compliant with HEP to promote the independent management of current diagnosis. In Progress  2. Patient will increase lumbar forward bending to reach mid shin level for function of dressing. In Progress  3. Patient will improve bilateral hamstring flexibility to (25)deg during 90/90 testing. In Progress  4. Patient will report a decrease in complaints of low back pain from 9/10 to 6/10 during ADLs. In Progress    Long Term Goals:  8 weeks  1. Patient will improve core stabilization strength to Good to improve  tolerance to normal house work activities for self care independence. In Progress  2. Patient will increase lumbar forward bending to reach ankle level for function of dressing. In Progress  3. Patient will report a decrease in complaints of low back pain from 9/10 to 4/10 during ADLs. In Progress  4. Patient will improve FOTO limitation status from 63% to 55% placing the patient in the 40-60% impaired, limited, or restricted category indicating increased functional mobility. In Progress    Plan     Continue with Plan Of care and progress toward PT goals.      Gen Alvarez, PTA

## 2019-11-15 DIAGNOSIS — Z12.31 ENCOUNTER FOR SCREENING MAMMOGRAM FOR BREAST CANCER: Primary | ICD-10-CM

## 2019-12-12 ENCOUNTER — HOSPITAL ENCOUNTER (OUTPATIENT)
Dept: RADIOLOGY | Facility: HOSPITAL | Age: 69
Discharge: HOME | End: 2019-12-12
Attending: INTERNAL MEDICINE
Payer: MEDICARE

## 2019-12-12 DIAGNOSIS — Z12.31 ENCOUNTER FOR SCREENING MAMMOGRAM FOR BREAST CANCER: ICD-10-CM

## 2019-12-12 PROCEDURE — 77067 SCR MAMMO BI INCL CAD: CPT | Mod: TC,PO

## 2020-03-11 ENCOUNTER — HOSPITAL ENCOUNTER (OUTPATIENT)
Dept: RADIOLOGY | Facility: HOSPITAL | Age: 70
Discharge: HOME | End: 2020-03-11
Attending: INTERNAL MEDICINE
Payer: MEDICARE

## 2020-03-11 DIAGNOSIS — M81.0 SENILE OSTEOPOROSIS: ICD-10-CM

## 2020-03-11 PROCEDURE — 77080 DXA BONE DENSITY AXIAL: CPT | Mod: TC,PO

## 2020-12-31 ENCOUNTER — HOSPITAL ENCOUNTER (OUTPATIENT)
Dept: RADIOLOGY | Facility: HOSPITAL | Age: 70
Discharge: HOME | End: 2020-12-31
Attending: NURSE PRACTITIONER
Payer: MEDICARE

## 2020-12-31 DIAGNOSIS — Z12.31 SCREENING MAMMOGRAM, ENCOUNTER FOR: ICD-10-CM

## 2020-12-31 PROCEDURE — 77067 SCR MAMMO BI INCL CAD: CPT | Mod: TC,PO
